# Patient Record
Sex: FEMALE | URBAN - METROPOLITAN AREA
[De-identification: names, ages, dates, MRNs, and addresses within clinical notes are randomized per-mention and may not be internally consistent; named-entity substitution may affect disease eponyms.]

---
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
## 2024-09-06 ENCOUNTER — OFFICE VISIT (OUTPATIENT)
Dept: FAMILY MEDICINE CLINIC | Facility: CLINIC | Age: 19
End: 2024-09-06
Payer: COMMERCIAL

## 2024-09-06 VITALS
WEIGHT: 146 LBS | TEMPERATURE: 98.3 F | DIASTOLIC BLOOD PRESSURE: 74 MMHG | OXYGEN SATURATION: 96 % | HEART RATE: 76 BPM | BODY MASS INDEX: 25.87 KG/M2 | HEIGHT: 63 IN | SYSTOLIC BLOOD PRESSURE: 118 MMHG

## 2024-09-06 DIAGNOSIS — Z00.00 PHYSICAL EXAM, ANNUAL: Primary | ICD-10-CM

## 2024-09-06 DIAGNOSIS — F41.1 ANXIETY STATE: ICD-10-CM

## 2024-09-06 PROCEDURE — 99385 PREV VISIT NEW AGE 18-39: CPT | Performed by: FAMILY MEDICINE

## 2024-09-06 PROCEDURE — 99213 OFFICE O/P EST LOW 20 MIN: CPT | Performed by: FAMILY MEDICINE

## 2024-09-06 RX ORDER — ESCITALOPRAM OXALATE 10 MG/1
TABLET ORAL
COMMUNITY
Start: 2024-08-08 | End: 2024-09-06

## 2024-09-06 RX ORDER — ESCITALOPRAM OXALATE 20 MG/1
20 TABLET ORAL DAILY
Qty: 30 TABLET | Refills: 3 | Status: SHIPPED | OUTPATIENT
Start: 2024-09-06

## 2024-09-06 NOTE — PROGRESS NOTES
Ambulatory Visit  Name: Sandra Slater      : 2005      MRN: [id number]  Encounter Provider: Janet Badillo DO  Encounter Date: 2024   Encounter department: [facility]    Assessment & Plan   1. Physical exam, annual  Comments:  send immunization records  negative ROS--no need for labs  healthy diet and exercise  f/u 1 yr  2. Anxiety state  Comments:  increase lexapro to 20mg daily  f/u 6 weeks  Orders:  -     escitalopram (LEXAPRO) 20 mg tablet; Take 1 tablet (20 mg total) by mouth daily       History of Present Illness     HPI  Pt presents as new pt for physical and establishment.      Preventively, pt doesn't have full immunization list with her but does have it at home.  Suspect she is up to date as she is currently in college.  She exercises regularly ().  Sees dental.  Drinking socially.  No drugs/sex/tobacco.    From a problem standpoint, hx of anxiety.  States she has been on lexapro for 3-4 years now.  Initially helped, but she notices more worrying about things she knows she doesn't need to worry about.  She has difficulty relaxing.  Wonders if it has to be changed.  No si/hi.  No sadness, hopelessness.  Motivation and energy okay.      Review of Systems   Constitutional:  Negative for chills, fatigue, fever and unexpected weight change.   HENT:  Negative for congestion, ear pain, hearing loss, postnasal drip, rhinorrhea, sinus pressure, sinus pain, sore throat, trouble swallowing and voice change.    Eyes:  Negative for pain, redness and visual disturbance.   Respiratory:  Negative for cough and shortness of breath.    Cardiovascular:  Negative for chest pain, palpitations and leg swelling.   Gastrointestinal:  Negative for abdominal pain, constipation, diarrhea and nausea.   Endocrine: Negative for cold intolerance, heat intolerance, polydipsia and polyuria.   Genitourinary:  Negative for dysuria, frequency and urgency.   Musculoskeletal:  Negative for  "arthralgias, joint swelling and myalgias.   Skin:  Negative for rash.        No suspicious lesions   Neurological:  Negative for weakness, numbness and headaches.   Hematological:  Negative for adenopathy.   Psychiatric/Behavioral:  Negative for dysphoric mood, sleep disturbance and suicidal ideas. The patient is nervous/anxious.        Objective     /74 (BP Location: Left arm, Patient Position: Sitting, Cuff Size: Standard)   Pulse 76   Temp 98.3 °F (36.8 °C)   Ht 5' 3\" (1.6 m)   Wt 66.2 kg (146 lb)   SpO2 96%   BMI 25.86 kg/m²     Physical Exam  Constitutional:       Appearance: Normal appearance.   HENT:      Head: Normocephalic and atraumatic.      Right Ear: Tympanic membrane, ear canal and external ear normal.      Left Ear: Tympanic membrane, ear canal and external ear normal.      Nose: Nose normal. No congestion.      Mouth/Throat:      Mouth: Mucous membranes are moist.      Pharynx: No oropharyngeal exudate or posterior oropharyngeal erythema.   Eyes:      Extraocular Movements: Extraocular movements intact.      Conjunctiva/sclera: Conjunctivae normal.      Pupils: Pupils are equal, round, and reactive to light.   Neck:      Vascular: No carotid bruit.   Cardiovascular:      Rate and Rhythm: Normal rate and regular rhythm.      Heart sounds: No murmur heard.     No friction rub. No gallop.   Pulmonary:      Effort: Pulmonary effort is normal.      Breath sounds: No wheezing, rhonchi or rales.   Abdominal:      General: Abdomen is flat. There is no distension.      Palpations: Abdomen is soft.      Tenderness: There is no abdominal tenderness.   Musculoskeletal:      Cervical back: Neck supple.   Lymphadenopathy:      Cervical: No cervical adenopathy.   Neurological:      General: No focal deficit present.      Mental Status: She is alert and oriented to person, place, and time.      Cranial Nerves: No cranial nerve deficit.      Motor: No weakness.      Deep Tendon Reflexes: Reflexes normal. "   Psychiatric:         Attention and Perception: Attention normal.         Mood and Affect: Mood is anxious.         Speech: Speech normal.         Behavior: Behavior normal.         Thought Content: Thought content normal.         Cognition and Memory: Cognition normal.         Judgment: Judgment normal.       Administrative Statements

## 2024-10-18 ENCOUNTER — TELEMEDICINE (OUTPATIENT)
Dept: FAMILY MEDICINE CLINIC | Facility: CLINIC | Age: 19
End: 2024-10-18
Payer: COMMERCIAL

## 2024-10-18 ENCOUNTER — TELEPHONE (OUTPATIENT)
Dept: FAMILY MEDICINE CLINIC | Facility: CLINIC | Age: 19
End: 2024-10-18

## 2024-10-18 DIAGNOSIS — F41.1 ANXIETY STATE: Primary | ICD-10-CM

## 2024-10-18 PROCEDURE — 99213 OFFICE O/P EST LOW 20 MIN: CPT | Performed by: FAMILY MEDICINE

## 2024-10-18 NOTE — PROGRESS NOTES
Virtual Regular Visit  Name: Sandra Slater      : 2005      MRN: [id number]  Encounter Provider: Janet Badillo DO  Encounter Date: 10/18/2024   Encounter department: [facility]    Verification of patient location:    Patient is located at Home in the following state in which I hold an active license [place]    Assessment & Plan  Anxiety state  Doing well on lexapro 20mg daily  F/u 6 mo  Call sooner for any worsening symptoms or problems              Encounter provider Janet Badillo DO    The patient was identified by name and date of birth. Sandra Slater was informed that this is a telemedicine visit and that the visit is being conducted through the Epic Embedded platform. She agrees to proceed..  My office door was closed. No one else was in the room.  She acknowledged consent and understanding of privacy and security of the video platform. The patient has agreed to participate and understands they can discontinue the visit at any time.    Patient is aware this is a billable service.     History of Present Illness     HPI  Pt presents for f/u on lexapro.  Feels better after increase to 20mg.  Can step away stressors and evaluate them in a more functional/calm way and talk herself down when she needs to.  No sadness, hopelessness, tearfulness.  No si/hi.  Wants to continue same dosing    History obtained from : patient  Review of Systems  See hpi        Objective     There were no vitals taken for this visit.  Physical Exam  Constitutional:       Appearance: Normal appearance.   HENT:      Head: Normocephalic and atraumatic.   Eyes:      Extraocular Movements: Extraocular movements intact.      Conjunctiva/sclera: Conjunctivae normal.   Pulmonary:      Effort: Pulmonary effort is normal. No respiratory distress.   Neurological:      General: No focal deficit present.      Mental Status: She is alert and oriented to person, place, and time.   Psychiatric:         Mood and Affect: Mood  normal.         Behavior: Behavior normal.         Thought Content: Thought content normal.         Judgment: Judgment normal.         Visit Time  Total Visit Duration: 4

## 2024-10-18 NOTE — TELEPHONE ENCOUNTER
Attempted to contact patient to schedule 6 month follow up with PCP. Patient answered, but hung up.

## 2024-11-13 ENCOUNTER — TELEPHONE (OUTPATIENT)
Age: 19
End: 2024-11-13

## 2024-11-13 NOTE — TELEPHONE ENCOUNTER
Spoke with father because tried to call the pt and her phone just kept disconnecting. When pt calls back please see how she wants this sent to her. If she gets the schools fax# we can send it that way if she chooses.

## 2024-11-14 NOTE — TELEPHONE ENCOUNTER
Called and lm for her father because her phone is still disconnecting and not letting the call go through. We need to know how she wants this report to get sent to her.

## 2024-11-15 NOTE — TELEPHONE ENCOUNTER
Left another message for father because of daughter's issue with her phone as noted below. He never called back from the previous message that was left.

## 2024-12-19 DIAGNOSIS — F41.1 ANXIETY STATE: ICD-10-CM

## 2024-12-19 RX ORDER — ESCITALOPRAM OXALATE 20 MG/1
20 TABLET ORAL DAILY
Qty: 30 TABLET | Refills: 0 | Status: SHIPPED | OUTPATIENT
Start: 2024-12-19

## 2024-12-24 ENCOUNTER — TELEPHONE (OUTPATIENT)
Dept: FAMILY MEDICINE CLINIC | Facility: CLINIC | Age: 19
End: 2024-12-24

## 2024-12-24 NOTE — TELEPHONE ENCOUNTER
Pt dropped off physical form to be completed before 12/29/24 if possible. Put in the clinical folder for Dr Badillo.    Please call [phone] or [phone] when completed.

## 2025-01-27 DIAGNOSIS — F41.1 ANXIETY STATE: ICD-10-CM

## 2025-01-27 RX ORDER — ESCITALOPRAM OXALATE 20 MG/1
20 TABLET ORAL DAILY
Qty: 30 TABLET | Refills: 5 | Status: SHIPPED | OUTPATIENT
Start: 2025-01-27

## 2025-04-07 DIAGNOSIS — F41.1 ANXIETY STATE: ICD-10-CM

## 2025-04-08 RX ORDER — ESCITALOPRAM OXALATE 20 MG/1
20 TABLET ORAL DAILY
Qty: 30 TABLET | Refills: 5 | Status: SHIPPED | OUTPATIENT
Start: 2025-04-08

## 2025-04-08 NOTE — TELEPHONE ENCOUNTER
Patient called to request a refill for their Escitalopram advised a refill was requested on 4/7/25 and is pending approval. Patient verbalized understanding and is in agreement.     Does the patient have enough for 3 days?   [] Yes   [x] No - Send as HP to POD

## 2025-05-12 DIAGNOSIS — F41.1 ANXIETY STATE: ICD-10-CM

## 2025-05-12 RX ORDER — ESCITALOPRAM OXALATE 20 MG/1
20 TABLET ORAL DAILY
Qty: 30 TABLET | Refills: 0 | Status: SHIPPED | OUTPATIENT
Start: 2025-05-12

## 2025-07-02 ENCOUNTER — TELEPHONE (OUTPATIENT)
Dept: FAMILY MEDICINE CLINIC | Facility: CLINIC | Age: 20
End: 2025-07-02